# Patient Record
Sex: MALE | Race: WHITE
[De-identification: names, ages, dates, MRNs, and addresses within clinical notes are randomized per-mention and may not be internally consistent; named-entity substitution may affect disease eponyms.]

---

## 2019-11-09 ENCOUNTER — HOSPITAL ENCOUNTER (EMERGENCY)
Dept: HOSPITAL 56 - MW.ED | Age: 29
Discharge: HOME | End: 2019-11-09
Payer: SELF-PAY
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
## 2019-11-09 DIAGNOSIS — F17.210: ICD-10-CM

## 2019-11-09 DIAGNOSIS — E11.9: ICD-10-CM

## 2019-11-09 DIAGNOSIS — I10: ICD-10-CM

## 2019-11-09 DIAGNOSIS — Z79.84: ICD-10-CM

## 2019-11-09 DIAGNOSIS — Z88.6: ICD-10-CM

## 2019-11-09 DIAGNOSIS — S09.90XA: Primary | ICD-10-CM

## 2019-11-09 DIAGNOSIS — Z79.899: ICD-10-CM
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
## 2019-11-09 DIAGNOSIS — F32.9: ICD-10-CM

## 2019-11-09 NOTE — CT
CT HEAD



DATE: 11/9/2019



CLINICAL HISTORY: Patient with headache after head trauma. 



TECHNIQUE: Standard CT scanning of the head was performed.



COMPARISON: None.



FINDINGS: 



There is no intracranial hemorrhage.



The gray matter-white matter differentiation is intact.



The size of the ventricular system is normal for age.



There is no mass effect or midline shift.



The calvarium is unremarkable.



The orbits are unremarkable.



The paranasal sinuses demonstrate a moderate mucous retention cyst in the 

left maxillary sinus. 



The mastoid air cells are unremarkable.



The soft tissues are unremarkable.



IMPRESSION:



Normal head CT.



Please note that all CT scans at this facility use dose modulation, 

iterative reconstruction, and/or weight-based dosing when appropriate to 

reduce radiation dose to as low as reasonably achievable.



Dictated by: Jarad Flores MD @ 11/09/2019 21:19:46



(Electronically Signed)

## 2019-11-09 NOTE — EDM.PDOC
ED HPI GENERAL MEDICAL PROBLEM





- General


Chief Complaint: Neurological Problem


Stated Complaint: HEAD INJURY


Time Seen by Provider: 11/09/19 21:33





- History of Present Illness


INITIAL COMMENTS - FREE TEXT/NARRATIVE: 





HISTORY AND PHYSICAL:





History of present illness:


The patient is a 29-year-old male who presents with complaints of headache 

after he fell out of a chair last evening at about 6 PM when it broke. He said 

that the chair leg broke and he hit his head against the wall and he has had a 

headache since that time. He did not pass out or black out and has had no 

nausea or vomiting and no neck or back pain. There is no discrete area on his 

head that is hurting it started diffuse discomfort and he has no other injuries 

as a result of these events.





Review of systems: 


As per history of present illness and below otherwise all systems reviewed and 

negative.





Past medical history: 


As per history of present illness and as reviewed below otherwise 

noncontributory.





Surgical history: 


As per history of present illness and as reviewed below otherwise 

noncontributory.





Social history: 


No reported history of drug or alcohol abuse.





Family history: 


As per history of present illness and as reviewed below otherwise 

noncontributory.





Physical exam:


General: Well-developed well-nourished morbidly obese man who is nontoxic and 

vital signs are noted by me. He ambulated into the ED without distress


HEENT: Atraumatic, normocephalic, there is no gross area of soft tissue 

swelling and there is diffuse tenderness of his posterior scalp area without 

any defects or deformities, pupils reactive, negative for conjunctival pallor 

or scleral icterus, mucous membranes moist, throat clear, neck supple, nontender

, trachea midline. There are no midline step-offs in his defects of the 

cervical spine


Lungs: Clear to auscultation, breath sounds equal bilaterally, chest nontender.


Heart: S1S2, regular rate and rhythm no overt murmurs


Abdomen: Soft, nondistended, nontender. NABS


Pelvis: Deferred


Genitourinary: Deferred.


Rectal: Deferred.


Extremities: Atraumatic, full range of motion without deficit Neurovascular 

unremarkable.


Neuro: Awake, alert, oriented. Cranial nerves II through XII unremarkable. 

Cerebellum unremarkable. Motor and sensory unremarkable throughout. Exam 

nonfocal.





Diagnostics:


CT scan of the head





Therapeutics:


Patient says he will take Tylenol or ibuprofen at home





Impression: 


Closed head injury





Definitive disposition and diagnosis as appropriate pending reevaluation and 

review of above.


  ** Headache


Pain Score (Numeric/FACES): 7





- Related Data


 Allergies











Allergy/AdvReac Type Severity Reaction Status Date / Time


 


aspirin Allergy  Other Verified 11/09/19 20:05











Home Meds: 


 Home Meds





FLUoxetine HCl [Prozac] 20 mg PO DAILY 11/09/19 [History]


Lisinopril 20 mg PO DAILY 11/09/19 [History]


metFORMIN [Glucophage XR] 500 mg PO BID 11/09/19 [History]











Past Medical History


Cardiovascular History: Reports: Hypertension


Psychiatric History: Reports: Depression


Endocrine/Metabolic History: Reports: Diabetes, Type II





- Infectious Disease History


Infectious Disease History: Reports: Chicken Pox





- Past Surgical History


HEENT Surgical History: Reports: Oral Surgery





Social & Family History





- Family History


Family Medical History: Noncontributory





- Tobacco Use


Years of Tobacco use: 12


Packs/Tins Daily: 2





- Caffeine Use


Caffeine Use: Reports: Energy Drinks, Soda





- Recreational Drug Use


Recreational Drug Use: No





ED ROS GENERAL





- Review of Systems


Review Of Systems: ROS reveals no pertinent complaints other than HPI.





ED EXAM, GENERAL





- Physical Exam


Exam: See Below (See dictation)





Course





- Vital Signs


Last Recorded V/S: 


 Last Vital Signs











Temp  36.1 C   11/09/19 20:07


 


Pulse  88   11/09/19 20:07


 


Resp  20   11/09/19 20:07


 


BP  124/91 H  11/09/19 20:07


 


Pulse Ox  97   11/09/19 20:07














Departure





- Departure


Time of Disposition: 21:40


Disposition: Home, Self-Care 01


Condition: Good


Clinical Impression: 


Closed head injury


Qualifiers:


 Encounter type: initial encounter Qualified Code(s): S09.90XA - Unspecified 

injury of head, initial encounter








- Discharge Information


Referrals: 


PCP,Not In Area [Primary Care Provider] - 


Forms:  ED Department Discharge


Additional Instructions: 


The following information is given to patients seen in the emergency department 

who are being discharged to home. This information is to outline your options 

for follow-up care. We provide all patients seen in our emergency department 

with a follow-up referral.





The need for follow-up, as well as the timing and circumstances, are variable 

depending upon the specifics of your emergency department visit.





If you don't have a primary care physician on staff, we will provide you with a 

referral. We always advise you to contact your personal physician following an 

emergency department visit to inform them of the circumstance of the visit and 

for follow-up with them and/or the need for any referrals to a consulting 

specialist.





The emergency department will also refer you to a specialist when appropriate. 

This referral assures that you have the opportunity for followup care with a 

specialist. All of these measure are taken in an effort to provide you with 

optimal care, which includes your followup.





Under all circumstances we always encourage you to contact your private 

physician who remains a resource for coordinating  your care. When calling for 

followup care, please make the office aware that this follow-up is from your 

recent emergency room visit. If for any reason you are refused follow-up, 

please contact the [facility] emergency 

department at [phone] and ask to speak to the emergency department 

charge nurse.





[facility] 


Primary care- Internal Medicine and Family [address] [address]


[phone]








Use ice to areas of pain and swelling and use over-the-counter Tylenol or 

ibuprofen for pain management. Continue to monitor your symptoms and schedule 

follow-up in the clinic with your provider or one of hours as needed next week. 

Return to ER as needed and as discussed